# Patient Record
Sex: MALE | Race: WHITE | Employment: UNEMPLOYED | URBAN - METROPOLITAN AREA
[De-identification: names, ages, dates, MRNs, and addresses within clinical notes are randomized per-mention and may not be internally consistent; named-entity substitution may affect disease eponyms.]

---

## 2020-03-07 PROCEDURE — 99285 EMERGENCY DEPT VISIT HI MDM: CPT

## 2020-03-07 PROCEDURE — 94762 N-INVAS EAR/PLS OXIMTRY CONT: CPT

## 2020-03-07 PROCEDURE — 96374 THER/PROPH/DIAG INJ IV PUSH: CPT

## 2020-03-08 ENCOUNTER — HOSPITAL ENCOUNTER (EMERGENCY)
Age: 35
Discharge: HOME | End: 2020-03-08
Attending: EMERGENCY MEDICINE
Payer: SELF-PAY

## 2020-03-08 ENCOUNTER — APPOINTMENT (OUTPATIENT)
Dept: GENERAL RADIOLOGY | Age: 35
End: 2020-03-08
Attending: EMERGENCY MEDICINE
Payer: SELF-PAY

## 2020-03-08 VITALS
SYSTOLIC BLOOD PRESSURE: 123 MMHG | DIASTOLIC BLOOD PRESSURE: 59 MMHG | HEIGHT: 76 IN | OXYGEN SATURATION: 97 % | BODY MASS INDEX: 30.44 KG/M2 | TEMPERATURE: 98.1 F | RESPIRATION RATE: 16 BRPM | WEIGHT: 250 LBS | HEART RATE: 71 BPM

## 2020-03-08 DIAGNOSIS — T40.2X1A OPIOID OVERDOSE, ACCIDENTAL OR UNINTENTIONAL, INITIAL ENCOUNTER (HCC): Primary | ICD-10-CM

## 2020-03-08 LAB
ALBUMIN SERPL-MCNC: 3.5 G/DL (ref 3.5–5)
ALBUMIN/GLOB SERPL: 1.3 {RATIO} (ref 1.1–2.2)
ALP SERPL-CCNC: 35 U/L (ref 45–117)
ALT SERPL-CCNC: 75 U/L (ref 12–78)
ANION GAP SERPL CALC-SCNC: 6 MMOL/L (ref 5–15)
AST SERPL-CCNC: 34 U/L (ref 15–37)
BASOPHILS # BLD: 0 K/UL (ref 0–0.1)
BASOPHILS NFR BLD: 0 % (ref 0–1)
BILIRUB SERPL-MCNC: 0.6 MG/DL (ref 0.2–1)
BUN SERPL-MCNC: 11 MG/DL (ref 6–20)
BUN/CREAT SERPL: 11 (ref 12–20)
CALCIUM SERPL-MCNC: 8 MG/DL (ref 8.5–10.1)
CHLORIDE SERPL-SCNC: 109 MMOL/L (ref 97–108)
CO2 SERPL-SCNC: 29 MMOL/L (ref 21–32)
CREAT SERPL-MCNC: 1.01 MG/DL (ref 0.7–1.3)
DIFFERENTIAL METHOD BLD: ABNORMAL
EOSINOPHIL # BLD: 0 K/UL (ref 0–0.4)
EOSINOPHIL NFR BLD: 0 % (ref 0–7)
ERYTHROCYTE [DISTWIDTH] IN BLOOD BY AUTOMATED COUNT: 12.9 % (ref 11.5–14.5)
GLOBULIN SER CALC-MCNC: 2.7 G/DL (ref 2–4)
GLUCOSE SERPL-MCNC: 98 MG/DL (ref 65–100)
HCT VFR BLD AUTO: 40.3 % (ref 36.6–50.3)
HGB BLD-MCNC: 14.1 G/DL (ref 12.1–17)
IMM GRANULOCYTES # BLD AUTO: 0 K/UL (ref 0–0.04)
IMM GRANULOCYTES NFR BLD AUTO: 0 % (ref 0–0.5)
LYMPHOCYTES # BLD: 0.7 K/UL (ref 0.8–3.5)
LYMPHOCYTES NFR BLD: 7 % (ref 12–49)
MCH RBC QN AUTO: 30.7 PG (ref 26–34)
MCHC RBC AUTO-ENTMCNC: 35 G/DL (ref 30–36.5)
MCV RBC AUTO: 87.6 FL (ref 80–99)
MONOCYTES # BLD: 0.6 K/UL (ref 0–1)
MONOCYTES NFR BLD: 6 % (ref 5–13)
NEUTS SEG # BLD: 8.9 K/UL (ref 1.8–8)
NEUTS SEG NFR BLD: 87 % (ref 32–75)
NRBC # BLD: 0 K/UL (ref 0–0.01)
NRBC BLD-RTO: 0 PER 100 WBC
PLATELET # BLD AUTO: 148 K/UL (ref 150–400)
PMV BLD AUTO: 9.7 FL (ref 8.9–12.9)
POTASSIUM SERPL-SCNC: 3.7 MMOL/L (ref 3.5–5.1)
PROT SERPL-MCNC: 6.2 G/DL (ref 6.4–8.2)
RBC # BLD AUTO: 4.6 M/UL (ref 4.1–5.7)
RBC MORPH BLD: ABNORMAL
SODIUM SERPL-SCNC: 144 MMOL/L (ref 136–145)
WBC # BLD AUTO: 10.2 K/UL (ref 4.1–11.1)

## 2020-03-08 PROCEDURE — 36415 COLL VENOUS BLD VENIPUNCTURE: CPT

## 2020-03-08 PROCEDURE — 74011250636 HC RX REV CODE- 250/636: Performed by: EMERGENCY MEDICINE

## 2020-03-08 PROCEDURE — 71045 X-RAY EXAM CHEST 1 VIEW: CPT

## 2020-03-08 PROCEDURE — 80053 COMPREHEN METABOLIC PANEL: CPT

## 2020-03-08 PROCEDURE — 77010033678 HC OXYGEN DAILY

## 2020-03-08 PROCEDURE — 85025 COMPLETE CBC W/AUTO DIFF WBC: CPT

## 2020-03-08 RX ORDER — NALOXONE HYDROCHLORIDE 4 MG/.1ML
SPRAY NASAL
Qty: 2 EACH | Refills: 0 | Status: SHIPPED | OUTPATIENT
Start: 2020-03-08

## 2020-03-08 RX ORDER — ONDANSETRON 2 MG/ML
4 INJECTION INTRAMUSCULAR; INTRAVENOUS
Status: COMPLETED | OUTPATIENT
Start: 2020-03-08 | End: 2020-03-08

## 2020-03-08 RX ADMIN — SODIUM CHLORIDE 1000 ML: 900 INJECTION, SOLUTION INTRAVENOUS at 00:41

## 2020-03-08 RX ADMIN — ONDANSETRON 4 MG: 2 SOLUTION INTRAMUSCULAR; INTRAVENOUS at 00:41

## 2020-03-08 NOTE — ED NOTES
Pt here tonight after overdose on heroin and unknown \" pill I had\". Patient was given Narcan IM by family member which woke pt up from unresponsive state. Girlfriend reports that she did do chest compressions on pt. Patient states his grandmother  this am and he is upset. They live in [facility] and are here because of the grandmothers death. Noted many scratch marks to pt's chest, abdomen, and legs. Pt reportedly scratches himself frequently after drug use. Reports vomiting twice and is nauseous during assessment. Unknown down time. Pt is A&OX4, but falls asleep easily when not stimulated. Warm blanket offered, call bell within reach, safety precautions in place, bed locked and in the lowest position. Emergency Department Nursing Plan of Care       The Nursing Plan of Care is developed from the Nursing assessment and Emergency Department Attending provider initial evaluation. The plan of care may be reviewed in the ED Provider note.     The Plan of Care was developed with the following considerations:   Patient / Family readiness to learn indicated by:verbalized understanding  Persons(s) to be included in education: patient  Barriers to Learning/Limitations:No    Signed     Chet Triplett RN    3/8/2020   3:38 AM

## 2020-03-08 NOTE — ED TRIAGE NOTES
Patient here tonight after OD with heroin and unknown \" pill I had\". Patient was given Narcan per family member which he awoke. Patient states his grandmother  this am and he is \" upset\".  Doesn't live in [place]

## 2020-03-08 NOTE — DISCHARGE INSTRUCTIONS
Patient Education        Opioid Overdose: Care Instructions  Your Care Instructions    You have had treatment to help your body recover from taking too much of an opioid. You are getting better, but you may not feel well for a while. It takes time for the opioids to leave your body. How long it takes to feel better depends on which drug you took and how much you took of it. Opioids include illegal drugs such as heroin, often called smack, junk, H, and ska. Opioids also include medicines that doctors prescribe to treat pain. These are medicines such as oxycodone, methadone, and buprenorphine. They are sometimes sold and used illegally. Taking too much of an opioid can be dangerous. It may cause:  · Trouble breathing. · Low blood pressure. · A low heart rate. · A coma. When the doctor treated you for the overdose, he or she may have:  · Watched your symptoms or done tests to find out what kind of drug you took. · Given you fluids. · Given you oxygen to help you breathe. · Given you a medicine called naloxone to help reverse the effects of the opioid. · Done several tests, including blood tests, to see how you're responding to treatment. The doctor also watched you carefully to make sure you were recovering safely. Follow-up care is a key part of your treatment and safety. Be sure to make and go to all appointments, and call your doctor if you are having problems. It's also a good idea to know your test results and keep a list of the medicines you take. How can you care for yourself at home? · If you take opioids regularly, your body gets used to them. This is called physical dependence. If you are physically dependent on opioids, you may have withdrawal symptoms when you stop using them or use less. These symptoms can include nausea, sweating, chills, diarrhea, stomach cramps, and muscle aches. Withdrawal can last up to several weeks, depending on which opioid you took and how long you took it.  You may feel very ill, but you probably aren't in medical danger. · Your doctor may give you medicine to help you feel better. To help you get through withdrawal, you can also:  ? Get plenty of rest.  ? Drink plenty of fluids. ? Stay active. ? Eat a healthy diet. · If you had a tube in your throat to help you breathe, you may have a sore throat or hoarseness that can last a few days. Sip liquids to help soothe your throat. · Do not drink alcohol or take illegal drugs. · Do not take medicines that make you tired, like sleeping pills or muscle relaxers. · Do not drive if you feel sleepy or groggy while you recover from an overdose. · Get help to stop using opioids. Talk to your doctor about substance use treatment programs. · Talk to your doctor or pharmacist about having a naloxone rescue kit on hand. When should you call for help? Call 911 anytime you think you may need emergency care. For example, call if:    · You feel you cannot stop from hurting yourself or someone else.   [facility] your doctor now or seek immediate medical care if:    · You have new or worse withdrawal symptoms, such as:  ? Stomach cramps. ? Vomiting. ? Diarrhea. ? Muscle aches. ? Sweating.    Watch closely for changes in your health, and be sure to contact your doctor if:    · You do not get better as expected. Where can you learn more? Go to http://carlitos-pablo.info/. Enter 710 [id number] in the search box to learn more about \"Opioid Overdose: Care Instructions. \"  Current as of: February 5, 2019  Content Version: 12.2  © 4697-8111 [employer], [employer]orporated. Care instructions adapted under license by [employer] (which disclaims liability or warranty for this information). If you have questions about a medical condition or this instruction, always ask your healthcare professional. [address] any warranty or liability for your use of this information.          Patient Education        Learning About Naloxone for Opioid Overdose  What is naloxone? Opioids are strong pain medicines. Examples include hydrocodone, oxycodone, fentanyl, and morphine. Heroin is an example of an illegal opioid. Taking too much of an opioid can cause death. An overdose is an emergency. Naloxone is a medicine that reverses the effects of an overdose. If you take it soon enough after an overdose, it can save your life. Naloxone comes in a rescue kit you can carry with you. You may hear it called a Narcan kit for short. The rescue kit may contain:  · The medicine. · Syringes and needles. · A nasal adapter for the syringes. · A separate nasal spray device. Your doctor can give you a prescription for a rescue kit and show you how to use it. In some places you can buy Narcan kits without a prescription. When is naloxone used? Naloxone is used when a person shows signs of an opioid overdose. A person may have overdosed if he or she is:  · Sleepy or hard to wake up. · Confused. · Not breathing normally. Make sure your family and friends know about these signs of an overdose. If someone appears to have overdosed, call 911. A drug overdose is an emergency. How do you use it? If you overdose, you may not be able to give yourself the medicine. So it's very important that your friends and family know how and when to give it to you. Rescue kits come with instructions. There are two ways to give the medicine. Many rescue kits can be used for either method. The methods are:  · Injection with needle and syringe. ? Training may be needed in order to use this method correctly. ? Some rescue kits come with automatic injectors that don't require special training. ? The medicine can be injected through clothing. · Nasal spray. ? Many rescue kits come with a nasal adapter. It attaches to the syringe and turns the medicine into a mist.  ? The mist is sprayed into the nose of a person who has overdosed.  The person needs to be lying down when the mist is sprayed. Overdose symptoms may return a few minutes after the first dose from the rescue kit. If that happens, a second dose is needed. Rescue kits come with two doses for that reason. Keep your rescue kit with you always. You never know when you might need it. If you think you or someone else may have overdosed but you're not sure, use the kit anyway. Aside from going through withdrawal, which may be uncomfortable, there is no downside to using this medicine. Always go to the emergency room after using naloxone. Doctors will want to make sure the overdose has been reversed. Follow-up care is a key part of your treatment and safety. Be sure to make and go to all appointments, and call your doctor if you are having problems. It's also a good idea to know your test results and keep a list of the medicines you take. Where can you learn more? Go to http://carlitos-pablo.info/. Enter A812 in the search box to learn more about \"Learning About Naloxone for Opioid Overdose. \"  Current as of: February 5, 2019  Content Version: 12.2  © 6744-1163 [employer]. Care instructions adapted under license by [employer] (which disclaims liability or warranty for this information). If you have questions about a medical condition or this instruction, always ask your healthcare professional. [address] any warranty or liability for your use of this information.

## 2020-03-08 NOTE — ED PROVIDER NOTES
EMERGENCY DEPARTMENT HISTORY AND PHYSICAL EXAM      Date: 3/8/2020  Patient Name: Kristie Ibrahim    History of Presenting Illness     Chief Complaint   Patient presents with    Drug Overdose     History Provided By: Patient and girlfriend    HPI: Kristie Ibrahim, 29 y.o. male with past medical history significant for heroin abuse who presents via private vehicle to the ED with cc of heroin overdose about 2 hours prior to arrival.  Patient states he has a long heroin abuse history and was clean for 4 months up until 2 days ago. His grandmother was in the hospital and he states the stress of that caused him to start using again. She passed this morning and he decided to use again tonight. His girlfriend found him around 21  and gave him 2 mg of IM Narcan. She also started doing chest compressions and hitting his face trying to wake him up. He currently complains of chest pain and left lower abdomen pain. Patient's girlfriend states that she may have cracked a rib doing chest compressions on him trying to wake him up. PMHx: IV heroin abuse  Social Hx: Smokes 1+ pack per day, occasional alcohol use, IV heroin abuse    PCP: None    There are no other complaints, changes, or physical findings at this time. No current facility-administered medications on file prior to encounter. No current outpatient medications on file prior to encounter. Past History     Past Medical History:  No past medical history on file. Past Surgical History:  No past surgical history on file. Family History:  No family history on file. Social History:  Social History     Tobacco Use    Smoking status: Not on file   Substance Use Topics    Alcohol use: Not on file    Drug use: Not on file     Allergies:  No Known Allergies  Review of Systems   Review of Systems   Constitutional: Negative for chills and fever. HENT: Negative for congestion, rhinorrhea, sneezing and sore throat.     Eyes: Negative for redness and visual disturbance. Respiratory: Negative for shortness of breath. Cardiovascular: Positive for chest pain. Negative for leg swelling. Gastrointestinal: Negative for abdominal pain, nausea and vomiting. Genitourinary: Negative for difficulty urinating and frequency. Musculoskeletal: Negative for back pain, myalgias and neck stiffness. Skin: Positive for color change and wound. Negative for rash. Neurological: Negative for dizziness, syncope, weakness and headaches. Hematological: Negative for adenopathy. All other systems reviewed and are negative. Physical Exam   Physical Exam  Vitals signs and nursing note reviewed. Constitutional:       Appearance: Normal appearance. He is well-developed. HENT:      Head: Normocephalic and atraumatic. Eyes:      Comments: Pupils are 3 mm and reactive   Neck:      Musculoskeletal: Full passive range of motion without pain, normal range of motion and neck supple. Cardiovascular:      Rate and Rhythm: Normal rate and regular rhythm. Pulses: Normal pulses. Heart sounds: Normal heart sounds. No murmur. Pulmonary:      Effort: Pulmonary effort is normal. No respiratory distress. Breath sounds: Normal breath sounds. Chest:      Chest wall: No tenderness. Abdominal:      General: Bowel sounds are normal.      Palpations: Abdomen is soft. Tenderness: There is no abdominal tenderness. There is no guarding or rebound. Skin:     General: Skin is warm and dry. Findings: No erythema or rash. Neurological:      Mental Status: He is alert and oriented to person, place, and time. Psychiatric:         Speech: Speech normal.         Behavior: Behavior normal.         Thought Content:  Thought content normal.         Judgment: Judgment normal.       Diagnostic Study Results   Labs -     Recent Results (from the past 12 hour(s))   CBC WITH AUTOMATED DIFF    Collection Time: 03/08/20  1:22 AM   Result Value Ref Range    WBC 10.2 4.1 - 11.1 K/uL    RBC 4.60 4.10 - 5.70 M/uL    HGB 14.1 12.1 - 17.0 g/dL    HCT 40.3 36.6 - 50.3 %    MCV 87.6 80.0 - 99.0 FL    MCH 30.7 26.0 - 34.0 PG    MCHC 35.0 30.0 - 36.5 g/dL    RDW 12.9 11.5 - 14.5 %    PLATELET 316 (L) 528 - 400 K/uL    MPV 9.7 8.9 - 12.9 FL    NRBC 0.0 0  WBC    ABSOLUTE NRBC 0.00 0.00 - 0.01 K/uL    NEUTROPHILS PENDING %    LYMPHOCYTES PENDING %    MONOCYTES PENDING %    EOSINOPHILS PENDING %    BASOPHILS PENDING %    IMMATURE GRANULOCYTES PENDING %    ABS. NEUTROPHILS PENDING K/UL    ABS. LYMPHOCYTES PENDING K/UL    ABS. MONOCYTES PENDING K/UL    ABS. EOSINOPHILS PENDING K/UL    ABS. BASOPHILS PENDING K/UL    ABS. IMM. GRANS. PENDING K/UL    DF PENDING        Radiologic Studies -   XR CHEST PORT   Final Result   IMPRESSION:   No acute process. Xr Chest Port    Result Date: 3/8/2020  IMPRESSION: No acute process. Medical Decision Making   I am the first provider for this patient. I reviewed the vital signs, available nursing notes, past medical history, past surgical history, family history and social history. Vital Signs-Reviewed the patient's vital signs. Patient Vitals for the past 12 hrs:   Temp Pulse Resp BP SpO2   03/08/20 0050  78 14 125/62 91 %   03/08/20 0003 98.1 °F (36.7 °C) 94 18 128/78 97 %     Pulse Oximetry Analysis - 91% on RA    Cardiac Monitor:   Rate: 78 bpm  Rhythm: Normal Sinus Rhythm     Records Reviewed: Nursing Notes    Provider Notes (Medical Decision Making):   59-year-old male presents after an IV heroin overdose with chest pain and multiple skin abnormalities. Will observe on the monitor, obtain basic labs including a CBC, CMP, and UDS. We will also obtain a chest x-ray to assess for pneumonia versus pneumothorax versus rib fracture. ED Course:   Initial assessment performed. The patients presenting problems have been discussed, and they are in agreement with the care plan formulated and outlined with them.   I have encouraged them to ask questions as they arise throughout their visit. Patient is awake and answering questions. I have taken his supplemental oxygen off of him and he is no longer de-satting. Will discharge with a prescription for naloxone and refer patient to outpatient substance abuse treatment. Progress Note:   Updated pt on all returned results and findings. Discussed the importance of proper follow up as referred below along with return precautions. Pt in agreement with the care plan and expresses agreement with and understanding of all items discussed. Disposition:  Discharge Note:  The pt is ready for discharge. The pt's signs, symptoms, diagnosis, and discharge instructions have been discussed and pt has conveyed their understanding. The pt is to follow up as recommended or return to ER should their symptoms worsen. Plan has been discussed and pt is in agreement. PLAN:  1. Current Discharge Medication List      START taking these medications    Details   naloxone (NARCAN) 4 mg/actuation nasal spray Use 1 spray intranasally, then discard. Repeat with new spray every 2 min as needed for opioid overdose symptoms, alternating nostrils. Qty: 2 Each, Refills: 0           2. Follow-up Information     Follow up With Specialties Details Why Contact Info    [place]  Schedule an appointment as soon as possible for a visit As needed for opioid abuse and medication treatment if you desire Address: [address] [place] [address]  Hours:   [facility]    Phone: [phone]    [facility] EMERGENCY DEPT Emergency Medicine  As needed, If symptoms worsen [facility] [place]  [phone]        Return to ED if worse     Diagnosis     Clinical Impression:   1. Opioid overdose, accidental or unintentional, initial encounter [facility])            Please note that this dictation was completed with Dragon, computer voice recognition software.   Quite often unanticipated grammatical, syntax, homophones, and other interpretive errors are inadvertently transcribed by the computer software. Please disregard these errors. Additionally, please excuse any errors that have escaped final proofreading.

## 2023-05-25 RX ORDER — NALOXONE HYDROCHLORIDE 4 MG/.1ML
SPRAY NASAL
COMMUNITY
Start: 2020-03-08

## 2023-12-21 ENCOUNTER — HOSPITAL ENCOUNTER (OUTPATIENT)
Facility: HOSPITAL | Age: 38
Discharge: HOME | End: 2023-12-24
Payer: MEDICAID

## 2023-12-21 VITALS
HEART RATE: 82 BPM | OXYGEN SATURATION: 100 % | DIASTOLIC BLOOD PRESSURE: 76 MMHG | BODY MASS INDEX: 34.7 KG/M2 | WEIGHT: 293.87 LBS | TEMPERATURE: 97.6 F | HEIGHT: 77 IN | SYSTOLIC BLOOD PRESSURE: 123 MMHG | RESPIRATION RATE: 18 BRPM

## 2023-12-21 LAB
ERYTHROCYTE [DISTWIDTH] IN BLOOD BY AUTOMATED COUNT: 13.3 % (ref 11.5–14.5)
HCT VFR BLD AUTO: 44.5 % (ref 36.6–50.3)
HGB BLD-MCNC: 15.6 G/DL (ref 12.1–17)
MCH RBC QN AUTO: 30 PG (ref 26–34)
MCHC RBC AUTO-ENTMCNC: 35.1 G/DL (ref 30–36.5)
MCV RBC AUTO: 85.6 FL (ref 80–99)
NRBC # BLD: 0 K/UL (ref 0–0.01)
NRBC BLD-RTO: 0 PER 100 WBC
PLATELET # BLD AUTO: 175 K/UL (ref 150–400)
PMV BLD AUTO: 9.4 FL (ref 8.9–12.9)
RBC # BLD AUTO: 5.2 M/UL (ref 4.1–5.7)
WBC # BLD AUTO: 5.7 K/UL (ref 4.1–11.1)

## 2023-12-21 PROCEDURE — 85027 COMPLETE CBC AUTOMATED: CPT

## 2023-12-21 PROCEDURE — 36415 COLL VENOUS BLD VENIPUNCTURE: CPT

## 2023-12-21 RX ORDER — SODIUM CHLORIDE, SODIUM LACTATE, POTASSIUM CHLORIDE, CALCIUM CHLORIDE 600; 310; 30; 20 MG/100ML; MG/100ML; MG/100ML; MG/100ML
INJECTION, SOLUTION INTRAVENOUS CONTINUOUS
Status: CANCELLED | OUTPATIENT
Start: 2023-12-28

## 2023-12-21 NOTE — PERIOP NOTE
down, no ponytails, buns, rd pins or clips. All body piercings must be removed. Please shower with antibacterial soap for three consecutive days before and on the morning of surgery, but do not apply any lotions, powders or deodorants after the shower on the day of surgery. Please use a fresh towels after each shower. Please sleep in clean clothes and change bed linens the night before surgery. Please do not shave for 48 hours prior to surgery. Shaving of the face is acceptable. 7. You should understand that if you do not follow these instructions your surgery may be cancelled. If your physical condition changes (I.e. fever, cold or flu) please contact your surgeon as soon as possible. 8. It is important that you be on time. If a situation occurs where you may be late, please call [phone] (OR Holding Area). 9. If you have any questions and or problems, please call [phone] (Pre-admission Testing). 10. Your surgery time may be subject to change. You will receive a phone call the evening prior if your time changes. 11.  If having outpatient surgery, you must have someone to drive you here, stay with you during the duration of your stay, and to drive you home at time of discharge. TAKE ALL MEDICATIONS DAY OF SURGERY EXCEPT: none   Continue xarelto. I understand a pre-operative phone call will be made to verify my surgery time. In the event that I am not available, I give permission for a message to be left on my answering service and/or with another person?   yes         ___________________      __________   _________    (Signature of Patient)             (Witness)                (Date and Time)

## 2023-12-27 ENCOUNTER — ANESTHESIA EVENT (OUTPATIENT)
Facility: HOSPITAL | Age: 38
End: 2023-12-27
Payer: MEDICAID

## 2023-12-28 ENCOUNTER — HOSPITAL ENCOUNTER (OUTPATIENT)
Facility: HOSPITAL | Age: 38
Setting detail: OUTPATIENT SURGERY
Discharge: HOME | End: 2023-12-28
Attending: SURGERY | Admitting: SURGERY
Payer: MEDICAID

## 2023-12-28 ENCOUNTER — ANESTHESIA (OUTPATIENT)
Facility: HOSPITAL | Age: 38
End: 2023-12-28
Payer: MEDICAID

## 2023-12-28 ENCOUNTER — APPOINTMENT (OUTPATIENT)
Facility: HOSPITAL | Age: 38
End: 2023-12-28
Attending: SURGERY
Payer: MEDICAID

## 2023-12-28 VITALS
SYSTOLIC BLOOD PRESSURE: 119 MMHG | TEMPERATURE: 98 F | OXYGEN SATURATION: 95 % | RESPIRATION RATE: 15 BRPM | DIASTOLIC BLOOD PRESSURE: 76 MMHG | HEIGHT: 77 IN | WEIGHT: 285.06 LBS | HEART RATE: 68 BPM | BODY MASS INDEX: 33.66 KG/M2

## 2023-12-28 DIAGNOSIS — I87.1 MAY-THURNER SYNDROME: ICD-10-CM

## 2023-12-28 PROCEDURE — C1725 CATH, TRANSLUMIN NON-LASER: HCPCS | Performed by: SURGERY

## 2023-12-28 PROCEDURE — 6360000002 HC RX W HCPCS: Performed by: SURGERY

## 2023-12-28 PROCEDURE — 6360000002 HC RX W HCPCS

## 2023-12-28 PROCEDURE — 6360000004 HC RX CONTRAST MEDICATION: Performed by: SURGERY

## 2023-12-28 PROCEDURE — 7100000001 HC PACU RECOVERY - ADDTL 15 MIN: Performed by: SURGERY

## 2023-12-28 PROCEDURE — 6370000000 HC RX 637 (ALT 250 FOR IP): Performed by: SURGERY

## 2023-12-28 PROCEDURE — 6370000000 HC RX 637 (ALT 250 FOR IP): Performed by: ANESTHESIOLOGY

## 2023-12-28 PROCEDURE — 3700000001 HC ADD 15 MINUTES (ANESTHESIA): Performed by: SURGERY

## 2023-12-28 PROCEDURE — 2500000003 HC RX 250 WO HCPCS: Performed by: ANESTHESIOLOGY

## 2023-12-28 PROCEDURE — 3700000000 HC ANESTHESIA ATTENDED CARE: Performed by: SURGERY

## 2023-12-28 PROCEDURE — 6360000002 HC RX W HCPCS: Performed by: ANESTHESIOLOGY

## 2023-12-28 PROCEDURE — 3600000014 HC SURGERY LEVEL 4 ADDTL 15MIN: Performed by: SURGERY

## 2023-12-28 PROCEDURE — 7100000010 HC PHASE II RECOVERY - FIRST 15 MIN: Performed by: SURGERY

## 2023-12-28 PROCEDURE — C1894 INTRO/SHEATH, NON-LASER: HCPCS | Performed by: SURGERY

## 2023-12-28 PROCEDURE — 2500000003 HC RX 250 WO HCPCS

## 2023-12-28 PROCEDURE — 2580000003 HC RX 258: Performed by: SURGERY

## 2023-12-28 PROCEDURE — C1753 CATH, INTRAVAS ULTRASOUND: HCPCS | Performed by: SURGERY

## 2023-12-28 PROCEDURE — 2580000003 HC RX 258: Performed by: ANESTHESIOLOGY

## 2023-12-28 PROCEDURE — C1876 STENT, NON-COA/NON-COV W/DEL: HCPCS | Performed by: SURGERY

## 2023-12-28 PROCEDURE — 7100000000 HC PACU RECOVERY - FIRST 15 MIN: Performed by: SURGERY

## 2023-12-28 PROCEDURE — 3600000004 HC SURGERY LEVEL 4 BASE: Performed by: SURGERY

## 2023-12-28 PROCEDURE — 2580000003 HC RX 258

## 2023-12-28 PROCEDURE — C1769 GUIDE WIRE: HCPCS | Performed by: SURGERY

## 2023-12-28 PROCEDURE — A4217 STERILE WATER/SALINE, 500 ML: HCPCS | Performed by: SURGERY

## 2023-12-28 PROCEDURE — 2709999900 HC NON-CHARGEABLE SUPPLY: Performed by: SURGERY

## 2023-12-28 DEVICE — VENOVO® VENOUS STENT SYSTEM 20 MM X 60 MM (120 CM DELIVERY CATHETER)
Type: IMPLANTABLE DEVICE | Site: COMMON ILIAC | Status: FUNCTIONAL
Brand: VENOVO®

## 2023-12-28 RX ORDER — DEXAMETHASONE SODIUM PHOSPHATE 4 MG/ML
4 INJECTION, SOLUTION INTRA-ARTICULAR; INTRALESIONAL; INTRAMUSCULAR; INTRAVENOUS; SOFT TISSUE
Status: DISCONTINUED | OUTPATIENT
Start: 2023-12-28 | End: 2023-12-28 | Stop reason: HOSPADM

## 2023-12-28 RX ORDER — PROCHLORPERAZINE EDISYLATE 5 MG/ML
5 INJECTION INTRAMUSCULAR; INTRAVENOUS
Status: COMPLETED | OUTPATIENT
Start: 2023-12-28 | End: 2023-12-28

## 2023-12-28 RX ORDER — ONDANSETRON 2 MG/ML
INJECTION INTRAMUSCULAR; INTRAVENOUS PRN
Status: DISCONTINUED | OUTPATIENT
Start: 2023-12-28 | End: 2023-12-28 | Stop reason: SDUPTHER

## 2023-12-28 RX ORDER — FENTANYL CITRATE 50 UG/ML
INJECTION, SOLUTION INTRAMUSCULAR; INTRAVENOUS PRN
Status: DISCONTINUED | OUTPATIENT
Start: 2023-12-28 | End: 2023-12-28 | Stop reason: SDUPTHER

## 2023-12-28 RX ORDER — SODIUM CHLORIDE, SODIUM LACTATE, POTASSIUM CHLORIDE, CALCIUM CHLORIDE 600; 310; 30; 20 MG/100ML; MG/100ML; MG/100ML; MG/100ML
INJECTION, SOLUTION INTRAVENOUS CONTINUOUS PRN
Status: DISCONTINUED | OUTPATIENT
Start: 2023-12-28 | End: 2023-12-28 | Stop reason: SDUPTHER

## 2023-12-28 RX ORDER — SODIUM CHLORIDE 9 MG/ML
INJECTION, SOLUTION INTRAVENOUS PRN
Status: DISCONTINUED | OUTPATIENT
Start: 2023-12-28 | End: 2023-12-28 | Stop reason: HOSPADM

## 2023-12-28 RX ORDER — SODIUM CHLORIDE, SODIUM LACTATE, POTASSIUM CHLORIDE, CALCIUM CHLORIDE 600; 310; 30; 20 MG/100ML; MG/100ML; MG/100ML; MG/100ML
INJECTION, SOLUTION INTRAVENOUS CONTINUOUS
Status: DISCONTINUED | OUTPATIENT
Start: 2023-12-28 | End: 2023-12-28 | Stop reason: HOSPADM

## 2023-12-28 RX ORDER — ACETAMINOPHEN 325 MG/1
650 TABLET ORAL
Status: COMPLETED | OUTPATIENT
Start: 2023-12-28 | End: 2023-12-28

## 2023-12-28 RX ORDER — HYDROMORPHONE HYDROCHLORIDE 2 MG/ML
INJECTION, SOLUTION INTRAMUSCULAR; INTRAVENOUS; SUBCUTANEOUS PRN
Status: DISCONTINUED | OUTPATIENT
Start: 2023-12-28 | End: 2023-12-28 | Stop reason: SDUPTHER

## 2023-12-28 RX ORDER — HYDROMORPHONE HYDROCHLORIDE 1 MG/ML
0.25 INJECTION, SOLUTION INTRAMUSCULAR; INTRAVENOUS; SUBCUTANEOUS EVERY 5 MIN PRN
Status: DISCONTINUED | OUTPATIENT
Start: 2023-12-28 | End: 2023-12-28 | Stop reason: HOSPADM

## 2023-12-28 RX ORDER — FENTANYL CITRATE 50 UG/ML
100 INJECTION, SOLUTION INTRAMUSCULAR; INTRAVENOUS
Status: DISCONTINUED | OUTPATIENT
Start: 2023-12-28 | End: 2023-12-28 | Stop reason: HOSPADM

## 2023-12-28 RX ORDER — SODIUM CHLORIDE 0.9 % (FLUSH) 0.9 %
5-40 SYRINGE (ML) INJECTION EVERY 12 HOURS SCHEDULED
Status: DISCONTINUED | OUTPATIENT
Start: 2023-12-28 | End: 2023-12-28 | Stop reason: HOSPADM

## 2023-12-28 RX ORDER — MIDAZOLAM HYDROCHLORIDE 1 MG/ML
INJECTION INTRAMUSCULAR; INTRAVENOUS PRN
Status: DISCONTINUED | OUTPATIENT
Start: 2023-12-28 | End: 2023-12-28 | Stop reason: SDUPTHER

## 2023-12-28 RX ORDER — SODIUM CHLORIDE 0.9 % (FLUSH) 0.9 %
5-40 SYRINGE (ML) INJECTION PRN
Status: DISCONTINUED | OUTPATIENT
Start: 2023-12-28 | End: 2023-12-28 | Stop reason: HOSPADM

## 2023-12-28 RX ORDER — PROPOFOL 10 MG/ML
INJECTION, EMULSION INTRAVENOUS PRN
Status: DISCONTINUED | OUTPATIENT
Start: 2023-12-28 | End: 2023-12-28 | Stop reason: SDUPTHER

## 2023-12-28 RX ORDER — DEXMEDETOMIDINE HYDROCHLORIDE 100 UG/ML
INJECTION, SOLUTION INTRAVENOUS PRN
Status: DISCONTINUED | OUTPATIENT
Start: 2023-12-28 | End: 2023-12-28 | Stop reason: SDUPTHER

## 2023-12-28 RX ORDER — ROCURONIUM BROMIDE 10 MG/ML
INJECTION, SOLUTION INTRAVENOUS PRN
Status: DISCONTINUED | OUTPATIENT
Start: 2023-12-28 | End: 2023-12-28 | Stop reason: SDUPTHER

## 2023-12-28 RX ORDER — ONDANSETRON 2 MG/ML
4 INJECTION INTRAMUSCULAR; INTRAVENOUS ONCE
Status: DISCONTINUED | OUTPATIENT
Start: 2023-12-28 | End: 2023-12-28 | Stop reason: HOSPADM

## 2023-12-28 RX ORDER — DEXAMETHASONE SODIUM PHOSPHATE 4 MG/ML
INJECTION, SOLUTION INTRA-ARTICULAR; INTRALESIONAL; INTRAMUSCULAR; INTRAVENOUS; SOFT TISSUE PRN
Status: DISCONTINUED | OUTPATIENT
Start: 2023-12-28 | End: 2023-12-28 | Stop reason: SDUPTHER

## 2023-12-28 RX ORDER — LIDOCAINE HYDROCHLORIDE 20 MG/ML
INJECTION, SOLUTION EPIDURAL; INFILTRATION; INTRACAUDAL; PERINEURAL PRN
Status: DISCONTINUED | OUTPATIENT
Start: 2023-12-28 | End: 2023-12-28 | Stop reason: SDUPTHER

## 2023-12-28 RX ORDER — KETOROLAC TROMETHAMINE 30 MG/ML
30 INJECTION, SOLUTION INTRAMUSCULAR; INTRAVENOUS ONCE
Status: COMPLETED | OUTPATIENT
Start: 2023-12-28 | End: 2023-12-28

## 2023-12-28 RX ORDER — SUCCINYLCHOLINE CHLORIDE 20 MG/ML
INJECTION INTRAMUSCULAR; INTRAVENOUS PRN
Status: DISCONTINUED | OUTPATIENT
Start: 2023-12-28 | End: 2023-12-28 | Stop reason: SDUPTHER

## 2023-12-28 RX ORDER — OXYCODONE HYDROCHLORIDE 5 MG/1
5 TABLET ORAL
Status: DISCONTINUED | OUTPATIENT
Start: 2023-12-28 | End: 2023-12-28 | Stop reason: HOSPADM

## 2023-12-28 RX ORDER — HYDRALAZINE HYDROCHLORIDE 20 MG/ML
10 INJECTION INTRAMUSCULAR; INTRAVENOUS
Status: DISCONTINUED | OUTPATIENT
Start: 2023-12-28 | End: 2023-12-28 | Stop reason: HOSPADM

## 2023-12-28 RX ORDER — ACETAMINOPHEN 500 MG
1000 TABLET ORAL ONCE
Status: COMPLETED | OUTPATIENT
Start: 2023-12-28 | End: 2023-12-28

## 2023-12-28 RX ORDER — MIDAZOLAM HYDROCHLORIDE 2 MG/2ML
2 INJECTION, SOLUTION INTRAMUSCULAR; INTRAVENOUS
Status: DISCONTINUED | OUTPATIENT
Start: 2023-12-28 | End: 2023-12-28 | Stop reason: HOSPADM

## 2023-12-28 RX ORDER — FENTANYL CITRATE 50 UG/ML
25 INJECTION, SOLUTION INTRAMUSCULAR; INTRAVENOUS EVERY 5 MIN PRN
Status: DISCONTINUED | OUTPATIENT
Start: 2023-12-28 | End: 2023-12-28 | Stop reason: HOSPADM

## 2023-12-28 RX ADMIN — HYDROMORPHONE HYDROCHLORIDE 0.25 MG: 1 INJECTION, SOLUTION INTRAMUSCULAR; INTRAVENOUS; SUBCUTANEOUS at 17:25

## 2023-12-28 RX ADMIN — Medication 1000 MG: at 13:50

## 2023-12-28 RX ADMIN — HYDROMORPHONE HYDROCHLORIDE 0.2 MG: 2 INJECTION INTRAMUSCULAR; INTRAVENOUS; SUBCUTANEOUS at 16:32

## 2023-12-28 RX ADMIN — DEXAMETHASONE SODIUM PHOSPHATE 4 MG: 4 INJECTION INTRA-ARTICULAR; INTRALESIONAL; INTRAMUSCULAR; INTRAVENOUS; SOFT TISSUE at 16:44

## 2023-12-28 RX ADMIN — LIDOCAINE HYDROCHLORIDE 100 MG: 20 INJECTION, SOLUTION EPIDURAL; INFILTRATION; INTRACAUDAL; PERINEURAL at 15:40

## 2023-12-28 RX ADMIN — FENTANYL CITRATE 100 MCG: 50 INJECTION, SOLUTION INTRAMUSCULAR; INTRAVENOUS at 15:40

## 2023-12-28 RX ADMIN — PROCHLORPERAZINE EDISYLATE 5 MG: 5 INJECTION INTRAMUSCULAR; INTRAVENOUS at 17:19

## 2023-12-28 RX ADMIN — SUCCINYLCHOLINE CHLORIDE 180 MG: 20 INJECTION, SOLUTION INTRAMUSCULAR; INTRAVENOUS at 15:42

## 2023-12-28 RX ADMIN — ROCURONIUM BROMIDE 20 MG: 10 INJECTION INTRAVENOUS at 16:35

## 2023-12-28 RX ADMIN — HYDROMORPHONE HYDROCHLORIDE 0.25 MG: 1 INJECTION, SOLUTION INTRAMUSCULAR; INTRAVENOUS; SUBCUTANEOUS at 17:33

## 2023-12-28 RX ADMIN — ONDANSETRON 4 MG: 2 INJECTION INTRAMUSCULAR; INTRAVENOUS at 16:44

## 2023-12-28 RX ADMIN — KETOROLAC TROMETHAMINE 30 MG: 30 INJECTION, SOLUTION INTRAMUSCULAR; INTRAVENOUS at 17:15

## 2023-12-28 RX ADMIN — HYDROMORPHONE HYDROCHLORIDE 0.6 MG: 2 INJECTION INTRAMUSCULAR; INTRAVENOUS; SUBCUTANEOUS at 17:00

## 2023-12-28 RX ADMIN — DEXMEDETOMIDINE HYDROCHLORIDE 6 MCG: 100 INJECTION, SOLUTION, CONCENTRATE INTRAVENOUS at 16:46

## 2023-12-28 RX ADMIN — DEXMEDETOMIDINE HYDROCHLORIDE 8 MCG: 100 INJECTION, SOLUTION, CONCENTRATE INTRAVENOUS at 16:51

## 2023-12-28 RX ADMIN — MIDAZOLAM HYDROCHLORIDE 2 MG: 1 INJECTION, SOLUTION INTRAMUSCULAR; INTRAVENOUS at 15:37

## 2023-12-28 RX ADMIN — SODIUM CHLORIDE, POTASSIUM CHLORIDE, SODIUM LACTATE AND CALCIUM CHLORIDE: 600; 310; 30; 20 INJECTION, SOLUTION INTRAVENOUS at 15:37

## 2023-12-28 RX ADMIN — ROCURONIUM BROMIDE 30 MG: 10 INJECTION INTRAVENOUS at 16:29

## 2023-12-28 RX ADMIN — SUGAMMADEX 200 MG: 100 INJECTION, SOLUTION INTRAVENOUS at 16:53

## 2023-12-28 RX ADMIN — Medication 3 MG: at 15:50

## 2023-12-28 RX ADMIN — HYDROMORPHONE HYDROCHLORIDE 0.6 MG: 2 INJECTION INTRAMUSCULAR; INTRAVENOUS; SUBCUTANEOUS at 16:08

## 2023-12-28 RX ADMIN — Medication 3 AMPULE: at 13:46

## 2023-12-28 RX ADMIN — ROCURONIUM BROMIDE 20 MG: 10 INJECTION INTRAVENOUS at 16:04

## 2023-12-28 RX ADMIN — ROCURONIUM BROMIDE 10 MG: 10 INJECTION INTRAVENOUS at 15:40

## 2023-12-28 RX ADMIN — ROCURONIUM BROMIDE 10 MG: 10 INJECTION INTRAVENOUS at 16:20

## 2023-12-28 RX ADMIN — PROPOFOL 300 MG: 10 INJECTION, EMULSION INTRAVENOUS at 15:40

## 2023-12-28 RX ADMIN — SODIUM CHLORIDE, POTASSIUM CHLORIDE, SODIUM LACTATE AND CALCIUM CHLORIDE: 600; 310; 30; 20 INJECTION, SOLUTION INTRAVENOUS at 14:04

## 2023-12-28 RX ADMIN — ACETAMINOPHEN 650 MG: 325 TABLET ORAL at 17:34

## 2023-12-28 ASSESSMENT — PAIN DESCRIPTION - DESCRIPTORS
DESCRIPTORS: SORE
DESCRIPTORS: ACHING

## 2023-12-28 ASSESSMENT — PAIN DESCRIPTION - LOCATION
LOCATION: BACK
LOCATION: KNEE

## 2023-12-28 ASSESSMENT — PAIN SCALES - GENERAL
PAINLEVEL_OUTOF10: 2
PAINLEVEL_OUTOF10: 7
PAINLEVEL_OUTOF10: 0

## 2023-12-28 ASSESSMENT — PAIN - FUNCTIONAL ASSESSMENT: PAIN_FUNCTIONAL_ASSESSMENT: NONE - DENIES PAIN

## 2023-12-28 ASSESSMENT — PAIN DESCRIPTION - ORIENTATION
ORIENTATION: RIGHT
ORIENTATION: LOWER;MID

## 2023-12-28 NOTE — ANESTHESIA POSTPROCEDURE EVALUATION
Department of Anesthesiology  Postprocedure Note    Patient: Fuentes James  MRN: [id number]  YOB: 1985  Date of evaluation: 12/28/2023    Procedure Summary       Date: 12/28/23 Room / Location: [facility] MAIN OR M10 / [facility] MAIN OR    Anesthesia Start: 1537 Anesthesia Stop: 1706    Procedure: LEEFT LOWER EXTREMITY VENOGRAM, LEFT LOWER EXTREMITY ANGIOPLASTY AND STENTING, MAGALY (Leg Lower) Diagnosis:       May-Thurner syndrome      (May-Thurner syndrome [I87.1])    Providers: Jerica Bullock MD Responsible Provider: Emily Guthrie MD    Anesthesia Type: General ASA Status: 3            Anesthesia Type: General    Marichuy Phase I: Marichuy Score: 8    Marichuy Phase II:      Anesthesia Post Evaluation    Patient location during evaluation: PACU  Patient participation: complete - patient participated  Level of consciousness: awake  Airway patency: patent  Nausea & Vomiting: no vomiting  Cardiovascular status: hemodynamically stable  Respiratory status: acceptable  Hydration status: euvolemic    No notable events documented.

## 2023-12-28 NOTE — DISCHARGE INSTRUCTIONS
Patient Discharge Instructions    Enzo Malik / [id number] : 1985    Admitted 2023 Discharged: 2023     Take Home Medications       It is important that you take the medication exactly as they are prescribed. Keep your medication in the bottles provided by the pharmacist and keep a list of the medication names, dosages, and times to be taken in your wallet. Do not take other medications without consulting your doctor. What to do at [address] [place] [address]: Remove current bandage in 2 days then OK to shower and cover with dry dressing daily. Recommended diet: AHA    Recommended activity: As Tolerated. No Strenuous activity or heavy lifting for 3 days. If you experience any of the following symptoms increasing redness, swelling, or drainage at puncture site or severe sudden left leg swelling, please follow up with Dr Casi Smith or ER ASAP. Follow-up with Dr Casi Smith in 2 weeks [id number]    Note: This patient received general anesthesia including IV narcotics for this medical procedure and will test positive for opioids for the next week or so. Information obtained by :  I understand that if any problems occur once I am at home I am to contact my physician. I understand and acknowledge receipt of the instructions indicated above.                                                                                                                                            Physician's or R.N.'s Signature                                                                  Date/Time                                                                                                                                              Patient or Representative Signature                                                          Date/Time

## 2023-12-28 NOTE — BRIEF OP NOTE
Brief Postoperative Note      Patient: Kelly Real  YOB: 1985  MRN: [id number]    Date of Procedure: 12/28/2023    Pre-Op Diagnosis Codes:     * May-Thurner syndrome [I87.1]    Post-Op Diagnosis: Same       Procedure(s):  LEEFT LOWER EXTREMITY VENOGRAM, LEFT LOWER EXTREMITY ANGIOPLASTY AND STENTING, MAGALY    Surgeon(s):  Srikanth Heaton MD    Assistant:  Surgical Assistant: Michael Poon    Anesthesia: General    Estimated Blood Loss (mL): Minimal    Complications: None    Specimens:   * No specimens in log *    Implants:  Implant Name Type Inv. Item Serial No.  Lot No. LRB No. 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 PTA/stent w/ 16x4 [place] and 20x60 Venovo w/ good result.       Electronically signed by Jerson Cutler MD on 12/28/2023 at 5:01 PM

## 2023-12-28 NOTE — FLOWSHEET NOTE
12/28/23 1816   AVS Reviewed   AVS & discharge instructions reviewed with patient and/or representative? Yes   Reviewed instructions with Patient; Other (name and relationship in comment)  ((Mother) [place] [phone])   Level of Understanding Questions answered;Verbalized understanding

## 2024-06-01 ENCOUNTER — HOSPITAL ENCOUNTER (EMERGENCY)
Facility: HOSPITAL | Age: 39
Discharge: HOME | End: 2024-06-01
Attending: EMERGENCY MEDICINE
Payer: MEDICAID

## 2024-06-01 VITALS
WEIGHT: 296.74 LBS | RESPIRATION RATE: 14 BRPM | DIASTOLIC BLOOD PRESSURE: 75 MMHG | OXYGEN SATURATION: 90 % | BODY MASS INDEX: 35.04 KG/M2 | HEIGHT: 77 IN | SYSTOLIC BLOOD PRESSURE: 144 MMHG | HEART RATE: 77 BPM | TEMPERATURE: 98.1 F

## 2024-06-01 DIAGNOSIS — T40.2X1A OPIOID OVERDOSE, ACCIDENTAL OR UNINTENTIONAL, INITIAL ENCOUNTER (HCC): Primary | ICD-10-CM

## 2024-06-01 LAB
ALBUMIN SERPL-MCNC: 3.8 G/DL (ref 3.5–5)
ALBUMIN/GLOB SERPL: 1.2 (ref 1.1–2.2)
ALP SERPL-CCNC: 62 U/L (ref 45–117)
ALT SERPL-CCNC: 34 U/L (ref 12–78)
AMPHET UR QL SCN: NEGATIVE
ANION GAP SERPL CALC-SCNC: 5 MMOL/L (ref 5–15)
AST SERPL-CCNC: 39 U/L (ref 15–37)
BARBITURATES UR QL SCN: NEGATIVE
BASOPHILS # BLD: 0.1 K/UL (ref 0–0.1)
BASOPHILS NFR BLD: 0 % (ref 0–1)
BENZODIAZ UR QL: NEGATIVE
BILIRUB SERPL-MCNC: 0.5 MG/DL (ref 0.2–1)
BUN SERPL-MCNC: 21 MG/DL (ref 6–20)
BUN/CREAT SERPL: 16 (ref 12–20)
CALCIUM SERPL-MCNC: 8.8 MG/DL (ref 8.5–10.1)
CANNABINOIDS UR QL SCN: NEGATIVE
CHLORIDE SERPL-SCNC: 104 MMOL/L (ref 97–108)
CO2 SERPL-SCNC: 27 MMOL/L (ref 21–32)
COCAINE UR QL SCN: POSITIVE
CREAT SERPL-MCNC: 1.32 MG/DL (ref 0.7–1.3)
DIFFERENTIAL METHOD BLD: ABNORMAL
EOSINOPHIL # BLD: 0.2 K/UL (ref 0–0.4)
EOSINOPHIL NFR BLD: 1 % (ref 0–7)
ERYTHROCYTE [DISTWIDTH] IN BLOOD BY AUTOMATED COUNT: 12.5 % (ref 11.5–14.5)
ETHANOL SERPL-MCNC: <10 MG/DL (ref 0–0.08)
GLOBULIN SER CALC-MCNC: 3.2 G/DL (ref 2–4)
GLUCOSE BLD STRIP.AUTO-MCNC: 160 MG/DL (ref 65–117)
GLUCOSE SERPL-MCNC: 182 MG/DL (ref 65–100)
HCT VFR BLD AUTO: 41.2 % (ref 36.6–50.3)
HGB BLD-MCNC: 14.6 G/DL (ref 12.1–17)
IMM GRANULOCYTES # BLD AUTO: 0.1 K/UL (ref 0–0.04)
IMM GRANULOCYTES NFR BLD AUTO: 0 % (ref 0–0.5)
LYMPHOCYTES # BLD: 1.4 K/UL (ref 0.8–3.5)
LYMPHOCYTES NFR BLD: 11 % (ref 12–49)
Lab: ABNORMAL
MCH RBC QN AUTO: 29.6 PG (ref 26–34)
MCHC RBC AUTO-ENTMCNC: 35.4 G/DL (ref 30–36.5)
MCV RBC AUTO: 83.6 FL (ref 80–99)
METHADONE UR QL: NEGATIVE
MONOCYTES # BLD: 0.8 K/UL (ref 0–1)
MONOCYTES NFR BLD: 6 % (ref 5–13)
NEUTS SEG # BLD: 10.3 K/UL (ref 1.8–8)
NEUTS SEG NFR BLD: 82 % (ref 32–75)
NRBC # BLD: 0 K/UL (ref 0–0.01)
NRBC BLD-RTO: 0 PER 100 WBC
OPIATES UR QL: POSITIVE
PCP UR QL: NEGATIVE
PLATELET # BLD AUTO: 250 K/UL (ref 150–400)
PMV BLD AUTO: 10.2 FL (ref 8.9–12.9)
POTASSIUM SERPL-SCNC: 3.7 MMOL/L (ref 3.5–5.1)
PROT SERPL-MCNC: 7 G/DL (ref 6.4–8.2)
RBC # BLD AUTO: 4.93 M/UL (ref 4.1–5.7)
SERVICE CMNT-IMP: ABNORMAL
SODIUM SERPL-SCNC: 136 MMOL/L (ref 136–145)
WBC # BLD AUTO: 12.6 K/UL (ref 4.1–11.1)

## 2024-06-01 PROCEDURE — 93005 ELECTROCARDIOGRAM TRACING: CPT | Performed by: EMERGENCY MEDICINE

## 2024-06-01 PROCEDURE — 80307 DRUG TEST PRSMV CHEM ANLYZR: CPT

## 2024-06-01 PROCEDURE — 80053 COMPREHEN METABOLIC PANEL: CPT

## 2024-06-01 PROCEDURE — 99284 EMERGENCY DEPT VISIT MOD MDM: CPT

## 2024-06-01 PROCEDURE — 2580000003 HC RX 258: Performed by: EMERGENCY MEDICINE

## 2024-06-01 PROCEDURE — 96375 TX/PRO/DX INJ NEW DRUG ADDON: CPT

## 2024-06-01 PROCEDURE — 96374 THER/PROPH/DIAG INJ IV PUSH: CPT

## 2024-06-01 PROCEDURE — 6360000002 HC RX W HCPCS: Performed by: EMERGENCY MEDICINE

## 2024-06-01 PROCEDURE — 36415 COLL VENOUS BLD VENIPUNCTURE: CPT

## 2024-06-01 PROCEDURE — 82962 GLUCOSE BLOOD TEST: CPT

## 2024-06-01 PROCEDURE — 85025 COMPLETE CBC W/AUTO DIFF WBC: CPT

## 2024-06-01 PROCEDURE — 82077 ASSAY SPEC XCP UR&BREATH IA: CPT

## 2024-06-01 RX ORDER — NALOXONE HYDROCHLORIDE 0.4 MG/ML
0.4 INJECTION, SOLUTION INTRAMUSCULAR; INTRAVENOUS; SUBCUTANEOUS PRN
Status: DISCONTINUED | OUTPATIENT
Start: 2024-06-01 | End: 2024-06-01 | Stop reason: HOSPADM

## 2024-06-01 RX ORDER — ONDANSETRON 2 MG/ML
4 INJECTION INTRAMUSCULAR; INTRAVENOUS ONCE
Status: COMPLETED | OUTPATIENT
Start: 2024-06-01 | End: 2024-06-01

## 2024-06-01 RX ORDER — 0.9 % SODIUM CHLORIDE 0.9 %
1000 INTRAVENOUS SOLUTION INTRAVENOUS ONCE
Status: COMPLETED | OUTPATIENT
Start: 2024-06-01 | End: 2024-06-01

## 2024-06-01 RX ADMIN — NALXONE HYDROCHLORIDE 0.4 MG: 0.4 INJECTION INTRAMUSCULAR; INTRAVENOUS; SUBCUTANEOUS at 15:30

## 2024-06-01 RX ADMIN — SODIUM CHLORIDE 1000 ML: 9 INJECTION, SOLUTION INTRAVENOUS at 15:30

## 2024-06-01 RX ADMIN — ONDANSETRON 4 MG: 2 INJECTION INTRAMUSCULAR; INTRAVENOUS at 15:30

## 2024-06-01 ASSESSMENT — PAIN - FUNCTIONAL ASSESSMENT: PAIN_FUNCTIONAL_ASSESSMENT: NONE - DENIES PAIN

## 2024-06-01 NOTE — ED NOTES
Bedside shift change report given to LUPILLO Piper (oncoming nurse) by LUPILLO Greenfield (offgoing nurse). Report included the following information Nurse Handoff Report, ED Encounter Summary, ED SBAR, Intake/Output, MAR, Recent Results, and Neuro Assessment.

## 2024-06-01 NOTE — ED NOTES
Report given to Veronique Coburn RN by  LUPILLO Greenfield. Nurse was informed of reason for arrival, vitals, labs, medications, orders, procedures, results, anything left pending and current plan of action. Questions were asked and received prior to departure from the patient.

## 2024-06-01 NOTE — ED PROVIDER NOTES
[facility] EMERGENCY DEPT  EMERGENCY DEPARTMENT ENCOUNTER       Pt Name: Anderson Alejandre  MRN: [id number]  Birthdate 1985  Date of evaluation: 6/1/2024  Provider: Laly Young MD   PCP: None, None  Note Started: 5:23 PM EDT 6/1/24     CHIEF COMPLAINT       Chief Complaint   Patient presents with    Loss of Consciousness     Pt arrives to ED via EMS; found unresponsive in car with agonal respirations and pinpoint. Pt  EMS administered 2mg of intranasal narcan; pt became responsive and started breathing normally after narcan administration. Pt arrives to ED with eyes open spontaneously, A&Ox4; denies any drug use. Dr. Young at bedside.         HISTORY OF PRESENT ILLNESS: 1 or more elements      History From: patient, ems, History limited by: none     Anderson Alejandre is a 38 y.o. male who presents via EMS after being found unresponsive.       Please See [facility] for Additional Details of the HPI/PMH  Nursing Notes were all reviewed and agreed with or any disagreements were addressed in the HPI.     REVIEW OF SYSTEMS        Positives and Pertinent negatives as per HPI.    PAST HISTORY     Past Medical History:  Past Medical History:   Diagnosis Date    Hepatitis C     May-Thurner syndrome     Post-operative nausea and vomiting        Past Surgical History:  Past Surgical History:   Procedure Laterality Date    INVASIVE VASCULAR N/A 12/28/2023    LEEFT LOWER EXTREMITY VENOGRAM, LEFT LOWER EXTREMITY ANGIOPLASTY AND STENTING, MAGALY performed by Kenneth Garrido MD at [facility] MAIN OR    KNEE ARTHROSCOPY Bilateral        Family History:  No family history on file.    Social History:  Social History     Tobacco Use    Smoking status: Every Day     Current packs/day: 0.50     Types: Cigarettes    Smokeless tobacco: Current     Types: Snuff   Vaping Use    Vaping Use: Never used   Substance Use Topics    Alcohol use: Not Currently     Comment: quit 3 months    Drug use: Not Currently     Types: Heroin     Comment: 8/2023  have also provided discharge instructions for him that include: educational information regarding their diagnosis and treatment, and list of reasons why they would want to return to the ED prior to their follow-up appointment, should his condition change.     CLINICAL IMPRESSION    Discharge Note: The patient is stable for discharge home. The signs, symptoms, diagnosis, and discharge instructions have been discussed, understanding conveyed, and agreed upon. The patient is to follow up as recommended or return to ER should their symptoms worsen.      PATIENT REFERRED TO:  No follow-up provider specified.     DISCHARGE MEDICATIONS:     Medication List        ASK your doctor about these medications      naloxone 4 MG/0.1ML Liqd nasal spray     VIVITROL IM     Xarelto 20 MG Tabs tablet  Generic drug: rivaroxaban                DISCONTINUED MEDICATIONS:  Current Discharge Medication List          I am the Primary Clinician of Record.   Laly Young MD (electronically signed)    (Please note that parts of this dictation were completed with voice recognition software. Quite often unanticipated grammatical, syntax, homophones, and other interpretive errors are inadvertently transcribed by the computer software. Please disregards these errors. Please excuse any errors that have escaped final proofreading.)

## 2024-06-02 LAB
EKG ATRIAL RATE: 106 BPM
EKG DIAGNOSIS: NORMAL
EKG P AXIS: 30 DEGREES
EKG P-R INTERVAL: 142 MS
EKG Q-T INTERVAL: 354 MS
EKG QRS DURATION: 112 MS
EKG QTC CALCULATION (BAZETT): 470 MS
EKG R AXIS: 54 DEGREES
EKG T AXIS: 27 DEGREES
EKG VENTRICULAR RATE: 106 BPM

## 2024-06-02 NOTE — ED NOTES
Patient discharged from the ED by Jaron SULLIVAN. Diagnosis, medications, precautions and follow-ups were reviewed with the patient/family. Questions were asked and answered prior to departure. Patient departed the ED via ambulatory and was accompanied by family.

## (undated) DEVICE — INTRODUCER SHTH 8FR CANN L11CM DIL TIP 35MM BLU TUNGSTEN

## (undated) DEVICE — APPLICATOR MEDICATED 26 CC SOLUTION HI LT ORNG CHLORAPREP

## (undated) DEVICE — INFLATION DEVICE: Brand: ENCORE™ 26

## (undated) DEVICE — CATHETER ANGIO BER 038 3 CM 5 FRX65 CM SFT N BRAIDED SOFT-VU

## (undated) DEVICE — RADIFOCUS GLIDEWIRE: Brand: GLIDEWIRE

## (undated) DEVICE — INTRODUCER SHTH 7FR CANN L5.5CM DIL TIP 35MM ORNG TUNGSTEN

## (undated) DEVICE — Device: Brand: JELCO

## (undated) DEVICE — ATLAS®  PTA BALLOON DILATATION CATHETER 16 MM X 40 MM, 75 CM CATHETER: Brand: ATLAS®

## (undated) DEVICE — GOWN,SIRUS,NONRNF,SETINSLV,2XL,18/CS: Brand: MEDLINE

## (undated) DEVICE — SYRINGE ANGIO CNTRST DEL 20 CC POLYCARB LIGHT GRN MEDALLION

## (undated) DEVICE — COVER,TABLE,HEAVY DUTY,77"X90",STRL: Brand: MEDLINE

## (undated) DEVICE — DECANTER BAG 9": Brand: MEDLINE INDUSTRIES, INC.

## (undated) DEVICE — PINNACLE INTRODUCER SHEATH: Brand: PINNACLE

## (undated) DEVICE — PROVE COVER: Brand: UNBRANDED

## (undated) DEVICE — Device: Brand: VISIONS PV .035 DIGITAL IVUS CATHETER

## (undated) DEVICE — 3M™ TEGADERM™ TRANSPARENT FILM DRESSING FRAME STYLE, 1628, 6 IN X 8 IN (15 CM X 20 CM), 10/CT 8CT/CASE: Brand: 3M™ TEGADERM™

## (undated) DEVICE — GLOVE SURG SZ 8 L12IN FNGR THK94MIL STD WHT LTX FREE

## (undated) DEVICE — SOLUTION IV 1000ML 0.9% SOD CHL PH 5 INJ USP VIAFLX PLAS

## (undated) DEVICE — OR HYBRID-MRMC: Brand: MEDLINE INDUSTRIES, INC.

## (undated) DEVICE — BLADE CLIPPER GEN PURP NS